# Patient Record
(demographics unavailable — no encounter records)

---

## 2017-02-05 NOTE — PDOC
History of Present Illness





- General


Chief Complaint: Cold Symptoms


Stated Complaint: COLD SYMPTOMS


Time Seen by Provider: 02/05/17 21:14


History Source: Parent(s) (mother)


Exam Limitations: No Limitations





- History of Present Illness


Initial Comments: 





02/05/17 21:26


3 year 4-month-old male brought in by mother for evaluation of fever since 

yesterday associated with a cough and runny nose. Mother states has been giving 

7.5 ml of Tylenol every 6 hours but fever returns. Mother denies recent travel, 

recent illness and states child is fully vaccinated with no medical history.


Timing/Duration: reports: 24 hours


Severity: Yes: mild


Presenting Symptoms: Yes: fever, runny nose, persistent cough.  No: sore throat

, painful swallowing, diarrhea, poor fluid intake, poor solids intake, vomiting

, skin rash





Past History





- Travel


Traveled outside of the country in the last 30 days: No


Close contact w/someone who was outside of country & ill: No





- Past History


Allergies/Adverse Reactions: 


Allergies





No Known Allergies Allergy (Verified 02/05/17 21:08)


 








Home Medications: 


Ambulatory Orders





NK [No Known Home Medication]  01/10/16 








General Medical History: Yes: no pertinent history


Immunization Status Up to Date: Yes





- Family History


Significant Family History: Yes: no pertinent family hx





- Social History


Lives With: parents


Smoking Status: Never smoked





**Review of Systems





- Review of Systems


Able to Perform ROS?: Yes


Constitutional: Yes: Fever


HEENTM: Yes: Nose Congestion


Respiratory: Yes: Cough


ABD/GI: No: Symptoms Reported


: No: Symptoms Reported


Musculoskeletal: No: Symptoms Reported


Integumentary: No: Symptoms Reported





*Physical Exam





- Vital Signs


 Last Vital Signs











Temp Pulse Resp BP Pulse Ox


 


 102.7 F H  134 H  26   100/50   97 


 


 02/05/17 21:08  02/05/17 21:08  02/05/17 21:08  02/05/17 21:08  02/05/17 21:08














- Physical Exam


General Appearance: Yes: Nourished, Appropriately Dressed.  No: Apparent 

Distress


HEENT: positive: EOMI, ROMULO, TMs Normal, Pharynx Normal (moist), Nasal 

Congestion


Neck: positive: Supple


Respiratory/Chest: positive: Lungs Clear, Normal Breath Sounds.  negative: 

Respiratory Distress, Accessory Muscle Use


Cardiovascular: positive: Regular Rhythm (moist), Tachycardia.  negative: Murmur


Gastrointestinal/Abdominal: positive: Soft.  negative: Tenderness


Extremity: positive: Normal Capillary Refill


Integumentary: positive: Normal Color, Warm, Moist


Neurologic: positive: Normal Mood/Affect (appropriate for age ), Motor Strength 

5/5 (ambulatory)





ED Treatment Course





- Medications


Given in the ED: 


ED Medications














Discontinued Medications














Generic Name Dose Route Start Last Admin





  Trade Name Freq  PRN Reason Stop Dose Admin


 


Ibuprofen  170 mg 02/05/17 21:11 02/05/17 21:11





  Motrin Oral Suspension -  PO 02/05/17 21:12  170 mg





  NOW ONE   Administration














Medical Decision Making





- Medical Decision Making


02/05/17 21:28


Patient with fever cough and nasal congestion. Patient arrives febrile patient 

given Motrin in triage. Patient appears flulike versus viral. Influenza swab 

sent.





02/05/17 21:56


patient influenza A positive. Will discharge home with Tamiflu. 





*DC/Admit/Observation/Transfer


Diagnosis at time of Disposition: 


 Influenza due to influenza virus, type A, human





- Discharge Dispostion


Disposition: HOME


Condition at time of disposition: Improved





- Referrals


Referrals: 


Chacho Jackson MD [Primary Care Provider] - 





- Patient Instructions


Printed Discharge Instructions:  DI for Influenza -- Child


Additional Instructions: 


Please give Tamiflu as prescribed.


Please give 170 mg of Motrin every 6-8 hours for adequate fever control.


Push fluids.


Allow child to rest.

## 2018-06-03 NOTE — PDOC
History of Present Illness





- General


Chief Complaint: Eye Problem


Stated Complaint: SWOLLEN EYES


Time Seen by Provider: 06/03/18 11:00


History Source: Patient, Parent(s)


Exam Limitations: No Limitations





- History of Present Illness


Initial Comments: 





06/03/18 11:15


Patient is a 4-year-old male with no past medical history who presents to the 

emergency department today with bilateral eye swelling. Father states that it 

started last night. They gave him Benadryl which helped to reduce the swelling. 

Also admits to sneezing. Denies fevers, chills, cough, sore throat, nausea, 

vomiting and diarrhea.





Past History





- Travel


Traveled outside of the country in the last 30 days: No


Close contact w/someone who was outside of country & ill: No





- Past History


Allergies/Adverse Reactions: 


Allergies





No Known Allergies Allergy (Verified 06/03/18 10:44)


 








Home Medications: 


Ambulatory Orders





Loratadine [Children's Claritin] 5 mg PO DAILY #30 tab.chew 06/03/18 








Immunization Status Up to Date: Yes





- Social History


Smoking Status: Never smoked





**Review of Systems





- Review of Systems


Able to Perform ROS?: Yes


Comments:: 





06/03/18 11:02


CONSTITUTIONAL


Absent: Diaphoresis, Fever, Loss of Appetite, Malaise, Weakness


HEENT: 


Present: swollen eyes, sneezing Absent: Nasal congestion, Mouth Swelling


RESPIRATORY: 


Absent: Cough, Stridor, Wheezing


CARDIOVASCULAR: 


Absent: Edema, Loss of consciousness


GASTROINTESTINAL: 


Absent: Diarrhea, Vomiting


GENITOURINARY: 


Absent: Hematuria, Testicular Swelling, Lesions


MUSCULOSKELETAL: 


Absent: Joint Swelling


INTEGUEMENTARY: 


Absent: Lesions, Pallor, Rash


NEUROLOGICAL: 


Absent: Seizure, Weakness, Dizziness


ENDOCRINE: 


Absent: Unexplained Weight Gain, Unexplained Weight Loss


HEMATOLOGY: 


Absent: Easy Bleeding, Easy Bruising, Lymph Node Abnormalities


06/03/18 11:15





Is the patient limited English proficient: No





*Physical Exam





- Vital Signs


 Last Vital Signs











Temp Pulse Resp BP Pulse Ox


 


 98.3 F   97   18 L  89/56   99 


 


 06/03/18 10:41  06/03/18 10:41  06/03/18 10:41  06/03/18 10:41  06/03/18 10:41














- Physical Exam


Comments: 





06/03/18 11:04


GENERAL: 


The child is awake, alert, well appearing and in no apparent distress.  The 

child is appropriately interactive.


EYES: 


The pupils are equal, round and reactive to light.  Conjunctiva are clear, no 

drainage. Lower eye lids mildly swollen.


HEENT: 


No nasal congestion or rhinorrhea. No sinus Tenderness. Mucous membranes are 

moist. No tonsillar erythema, exudate or edema.  Uvula is midline. No TM bulging

, dullness or erythema.


NECK: 


Neck is supple. No adenopathy.  No meningismus.  No stridor.  


CHEST: 


Lungs are clear to auscultation bilaterally. No crackles, wheezes or rhonchi. 

No respiratory distress or increased work of breathing.


CARDIOVASCULAR: 


Regular rate and rhythm.  Normal S1 and S2. No murmurs.


ABDOMEN: 


Soft, nontender and nondistended.  Normoactive bowel sounds.  No organomegaly.  

No masses. No guarding or rebound.


EXTREMITIES: 


Full range of motion.  No deformities.  No joint swelling or tenderness.


SKIN: 


Warm.  No rashes, bruising or swelling.  Capillary refill is brisk and 

symmetric.  


NEURO: 


Behavior is normal for age. Tone is normal.











Medical Decision Making





- Medical Decision Making





06/03/18 11:16


Patient is a 4-year-old male with no past medical history who presents with 

bilateral lower eye swelling for 1 day. Symptoms improved with Benadryl. Also 

with sneezing, most likely seasonal ALLERGIES. Vital signs are stable patient 

afebrile. We'll discharge home with Claritin. Patient to follow-up with his 

primary care doctor. Return precautions given. Father understands all discharge 

instructions and all questions were answered.





*DC/Admit/Observation/Transfer


Diagnosis at time of Disposition: 


 Seasonal allergies








- Discharge Dispostion


Disposition: HOME


Condition at time of disposition: Good


Decision to Admit order: No





- Prescriptions


Prescriptions: 


Loratadine [Children's Claritin] 5 mg PO DAILY #30 tab.chew





- Referrals


Referrals: 


Chacho Jackson MD [Primary Care Provider] - 





- Patient Instructions


Printed Discharge Instructions:  Allergic Rhinitis


Additional Instructions: 


Moses has seasonal allergies which is why his eyes are swollen. There is 

no sign of infection today


Please take the Children's Claritin daily. Follow the instructions on the rx 

label.


He may have Benadryl at night. Follow the manufacture's instructions


Cool compresses may help as well.


Follow up with his pediatrician this week.





Return to the ED if he has increased swelling, fevers, chills, or has any 

changes in his symptoms





- Post Discharge Activity


Forms/Work/School Notes:  Back to School